# Patient Record
Sex: FEMALE | Race: WHITE | Employment: FULL TIME | URBAN - NONMETROPOLITAN AREA
[De-identification: names, ages, dates, MRNs, and addresses within clinical notes are randomized per-mention and may not be internally consistent; named-entity substitution may affect disease eponyms.]

---
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
## 2023-03-09 ENCOUNTER — HOSPITAL ENCOUNTER (EMERGENCY)
Age: 23
Discharge: HOME | End: 2023-03-09
Payer: COMMERCIAL

## 2023-03-09 VITALS
SYSTOLIC BLOOD PRESSURE: 138 MMHG | OXYGEN SATURATION: 96 % | TEMPERATURE: 98.8 F | WEIGHT: 190 LBS | HEART RATE: 79 BPM | RESPIRATION RATE: 16 BRPM | BODY MASS INDEX: 32.44 KG/M2 | HEIGHT: 64 IN | DIASTOLIC BLOOD PRESSURE: 84 MMHG

## 2023-03-09 DIAGNOSIS — S05.01XA ABRASION OF RIGHT CORNEA, INITIAL ENCOUNTER: Primary | ICD-10-CM

## 2023-03-09 PROCEDURE — 99213 OFFICE O/P EST LOW 20 MIN: CPT

## 2023-03-09 RX ORDER — ERYTHROMYCIN 5 MG/G
OINTMENT OPHTHALMIC EVERY 6 HOURS
Qty: 3.5 G | Refills: 0 | Status: SHIPPED | OUTPATIENT
Start: 2023-03-09 | End: 2023-03-16

## 2023-03-09 RX ORDER — IBUPROFEN 400 MG/1
400 TABLET ORAL EVERY 6 HOURS PRN
Qty: 120 TABLET | Refills: 0 | Status: SHIPPED | OUTPATIENT
Start: 2023-03-09

## 2023-03-09 ASSESSMENT — VISUAL ACUITY
OD: 20/20
OU: 20/20
OS: 20/20

## 2023-03-09 ASSESSMENT — PAIN - FUNCTIONAL ASSESSMENT
PAIN_FUNCTIONAL_ASSESSMENT: 0-10
PAIN_FUNCTIONAL_ASSESSMENT: ACTIVITIES ARE NOT PREVENTED

## 2023-03-09 ASSESSMENT — PAIN SCALES - GENERAL: PAINLEVEL_OUTOF10: 5

## 2023-03-09 ASSESSMENT — PAIN DESCRIPTION - ORIENTATION: ORIENTATION: RIGHT

## 2023-03-09 ASSESSMENT — PAIN DESCRIPTION - LOCATION: LOCATION: EYE

## 2023-03-09 ASSESSMENT — PAIN DESCRIPTION - DESCRIPTORS: DESCRIPTORS: ACHING

## 2023-03-09 NOTE — Clinical Note
Janel London was seen and treated in our emergency department on 3/9/2023. She may return to work on 03/11/2023. If you have any questions or concerns, please don't hesitate to call.       Amelie Mustafa, APRN - CNP

## 2023-03-10 NOTE — ED PROVIDER NOTES
An  Urgent Care Encounter      CHIEF COMPLAINT       Chief Complaint   Patient presents with    Eye Pain       Nurses Notes reviewed and I agree except as noted in the HPI. HISTORY OFPRESENT ILLNESS   Melany Nair is a 23 y.o. The history is provided by the patient. No  was used. REVIEW OF SYSTEMS     Review of Systems    PAST MEDICAL HISTORY   No past medical history on file. SURGICAL HISTORY     Patient  has a past surgical history that includes Tympanostomy tube placement; Tonsillectomy; and Nose surgery. CURRENT MEDICATIONS       Previous Medications    No medications on file       ALLERGIES     Patient is has No Known Allergies. FAMILY HISTORY     Patient's family history is not on file. SOCIAL HISTORY     Patient  reports that she has never smoked. She does not have any smokeless tobacco history on file. She reports that she does not drink alcohol and does not use drugs. PHYSICAL EXAM     ED TRIAGE VITALS   ,  ,  ,  ,    Physical Exam    DIAGNOSTIC RESULTS   Labs:No results found for this visit on 03/09/23. IMAGING:  No orders to display     URGENT CARE COURSE:   There were no vitals filed for this visit. Medications - No data to display  PROCEDURES:  None  FINAL IMPRESSION    No diagnosis found. DISPOSITION/PLAN       PATIENT REFERRED TO:  No follow-up provider specified. DISCHARGE MEDICATIONS:  New Prescriptions    No medications on file     There are no discharge medications for this patient.       Pedro Holland, APRN - CNP

## 2023-03-10 NOTE — ED TRIAGE NOTES
To room with c/o right eye pain. She \"stabbed\" eye with screw  while working at [employer].

## 2023-03-20 ASSESSMENT — ENCOUNTER SYMPTOMS
STRIDOR: 0
VOMITING: 0
BLURRED VISION: 0
COUGH: 0
EYE ITCHING: 0
EYE DISCHARGE: 0
DOUBLE VISION: 0
BLIND SPOTS: 0
EYE REDNESS: 1
PERI-ORBITAL EDEMA: 1
NAUSEA: 0
CRUSTING: 1
APNEA: 0
CHOKING: 0
CHEST TIGHTNESS: 0
WHEEZING: 0
SHORTNESS OF BREATH: 0
EYE INFLAMMATION: 0
EYE PAIN: 1
PHOTOPHOBIA: 1
EYE WATERING: 1

## 2023-03-20 ASSESSMENT — VISUAL ACUITY: OU: 1

## 2024-05-14 ENCOUNTER — HOSPITAL ENCOUNTER (EMERGENCY)
Age: 24
Discharge: HOME | End: 2024-05-14
Payer: COMMERCIAL

## 2024-05-14 VITALS
RESPIRATION RATE: 18 BRPM | WEIGHT: 200 LBS | HEIGHT: 64 IN | TEMPERATURE: 98.6 F | OXYGEN SATURATION: 96 % | BODY MASS INDEX: 34.15 KG/M2 | SYSTOLIC BLOOD PRESSURE: 125 MMHG | HEART RATE: 74 BPM | DIASTOLIC BLOOD PRESSURE: 84 MMHG

## 2024-05-14 DIAGNOSIS — H92.01 OTALGIA OF RIGHT EAR: ICD-10-CM

## 2024-05-14 DIAGNOSIS — R09.A9 FOREIGN BODY SENSATION IN RIGHT EAR CANAL: Primary | ICD-10-CM

## 2024-05-14 PROCEDURE — 99212 OFFICE O/P EST SF 10 MIN: CPT

## 2024-05-14 PROCEDURE — 99213 OFFICE O/P EST LOW 20 MIN: CPT

## 2024-05-14 RX ORDER — MEDROXYPROGESTERONE ACETATE 150 MG/ML
150 INJECTION, SUSPENSION INTRAMUSCULAR
COMMUNITY

## 2024-05-14 ASSESSMENT — PAIN - FUNCTIONAL ASSESSMENT: PAIN_FUNCTIONAL_ASSESSMENT: 0-10

## 2024-05-14 ASSESSMENT — PAIN SCALES - GENERAL: PAINLEVEL_OUTOF10: 3

## 2024-05-14 ASSESSMENT — PAIN DESCRIPTION - LOCATION: LOCATION: EAR

## 2024-05-14 ASSESSMENT — PAIN DESCRIPTION - ORIENTATION: ORIENTATION: RIGHT

## 2024-05-14 NOTE — ED NOTES
Pt with complaints of right ear pain that started 6 days ago. Denies any drainage from ear. States she feels as if something might be in ear so she came in.      Kush Rodriguez LPN  05/14/24 2788

## 2024-05-14 NOTE — ED PROVIDER NOTES
[facility] URGENT CARE  Urgent Care Encounter       CHIEF COMPLAINT       Chief Complaint   Patient presents with    Otalgia     right       Nurses Notes reviewed and I agree except as noted in the HPI.  HISTORY OF PRESENT ILLNESS   Lupe Nick is a 23 y.o. female who presents with complaints of right ear pain that started last Wednesday. Pt reports pain is no longer there but reports she feels like she has an object in her ear. Pt reports \"fluttering\" noise around 20 minutes ago. Patient reports pain 3/10 at this time and discomforting.     The history is provided by the patient.       REVIEW OF SYSTEMS     Review of Systems   HENT:  Positive for ear pain.    All other systems reviewed and are negative.      PAST MEDICAL HISTORY   History reviewed. No pertinent past medical history.    SURGICALHISTORY     Patient  has a past surgical history that includes Tympanostomy tube placement; Tonsillectomy; and Nose surgery.    CURRENT MEDICATIONS       Previous Medications    IBUPROFEN (IBU) 400 MG TABLET    Take 1 tablet by mouth every 6 hours as needed for Pain    MEDROXYPROGESTERONE (DEPO-PROVERA) 150 MG/ML INJECTION    Inject 1 mL into the muscle every 3 months       ALLERGIES     Patient is is allergic to shellfish-derived products.    Patients   There is no immunization history on file for this patient.    FAMILY HISTORY     Patient's family history includes No Known Problems in her father and mother.    SOCIAL HISTORY     Patient  reports that she has never smoked. She does not have any smokeless tobacco history on file. She reports current alcohol use. She reports that she does not use drugs.    PHYSICAL EXAM     ED TRIAGE VITALS  BP: 125/84, Temp: 98.6 °F (37 °C), Pulse: 74, Respirations: 18, SpO2: 96 %,Estimated body mass index is 34.33 kg/m² as calculated from the following:    Height as of this encounter: 1.626 m (5' 4\").    Weight as of this encounter: 90.7 kg (200 lb).,Patient's last

## 2025-06-12 ENCOUNTER — HOSPITAL ENCOUNTER (EMERGENCY)
Age: 25
Discharge: HOME | End: 2025-06-12
Payer: COMMERCIAL

## 2025-06-12 VITALS
OXYGEN SATURATION: 99 % | HEART RATE: 77 BPM | SYSTOLIC BLOOD PRESSURE: 126 MMHG | RESPIRATION RATE: 17 BRPM | TEMPERATURE: 98.2 F | DIASTOLIC BLOOD PRESSURE: 85 MMHG

## 2025-06-12 DIAGNOSIS — J06.9 ACUTE UPPER RESPIRATORY INFECTION: Primary | ICD-10-CM

## 2025-06-12 PROCEDURE — 99213 OFFICE O/P EST LOW 20 MIN: CPT

## 2025-06-12 RX ORDER — CETIRIZINE HYDROCHLORIDE, PSEUDOEPHEDRINE HYDROCHLORIDE 5; 120 MG/1; MG/1
1 TABLET, FILM COATED, EXTENDED RELEASE ORAL 2 TIMES DAILY
Qty: 60 TABLET | Refills: 0 | Status: SHIPPED | OUTPATIENT
Start: 2025-06-12 | End: 2025-07-12

## 2025-06-12 ASSESSMENT — ENCOUNTER SYMPTOMS
GASTROINTESTINAL NEGATIVE: 1
SORE THROAT: 1
EYES NEGATIVE: 1
RESPIRATORY NEGATIVE: 1

## 2025-06-12 NOTE — ED NOTES
Pt to [facility] with c/o sore throat, nasal congestion and cough for 6 days. Pt reports she started out with a fever and now is afebrile. She reports taking ibuprofen for pain. Pt ALLAN.     Cris Taylor RN  06/12/25 2257

## 2025-06-12 NOTE — DISCHARGE INSTRUCTIONS
Rest.  Medications as directed.  Drink plenty of fluids.  Follow-up with primary care provider for any new or worsening symptoms Go to emergency department for any other symptoms or concerns deemed emergent.

## 2025-06-12 NOTE — ED PROVIDER NOTES
[facility] URGENT CARE  UrgentCare Encounter      CHIEFCOMPLAINT       Chief Complaint   Patient presents with    Pharyngitis    Nasal Congestion       Nurses Notes reviewed and I agree except as noted in the HPI.  HISTORY OF PRESENT ILLNESS   Lupe Nick is a 24 y.o. female who presents for sore throat for approximately 6 days.  States she feels that she is having a lot of drainage down the back of her throat.    REVIEW OF SYSTEMS     Review of Systems   Constitutional: Negative.    HENT:  Positive for congestion and sore throat.    Eyes: Negative.    Respiratory: Negative.     Gastrointestinal: Negative.    Genitourinary: Negative.    Musculoskeletal: Negative.    Neurological: Negative.    Psychiatric/Behavioral: Negative.         PAST MEDICAL HISTORY   History reviewed. No pertinent past medical history.    SURGICAL HISTORY     Patient  has a past surgical history that includes Tympanostomy tube placement; Tonsillectomy; and Nose surgery.    CURRENT MEDICATIONS       Discharge Medication List as of 6/12/2025  6:10 PM        CONTINUE these medications which have NOT CHANGED    Details   medroxyPROGESTERone (DEPO-PROVERA) 150 MG/ML injection Inject 1 mL into the muscle every 3 monthsHistorical Med      ibuprofen (IBU) 400 MG tablet Take 1 tablet by mouth every 6 hours as needed for Pain, Disp-120 tablet, R-0Normal             ALLERGIES     Patient is is allergic to shellfish-derived products.    FAMILY HISTORY     Patient'sfamily history includes No Known Problems in her father and mother.    SOCIAL HISTORY     Patient  reports that she has never smoked. She does not have any smokeless tobacco history on file. She reports current alcohol use. She reports that she does not use drugs.    PHYSICAL EXAM     ED TRIAGE VITALS  BP: 126/85, Temp: 98.2 °F (36.8 °C), Pulse: 77, Respirations: 17, SpO2: 99 %  Physical Exam  HENT:      Head: Normocephalic.      Right Ear: Tympanic membrane normal.      Left Ear:  Tympanic membrane normal.      Nose: Congestion present.      Mouth/Throat:      Pharynx: Postnasal drip present. No posterior oropharyngeal erythema.      Tonsils: No tonsillar exudate. 0 on the right. 0 on the left.   Neurological:      Mental Status: She is alert.         DIAGNOSTIC RESULTS   Labs:No results found for this visit on 06/12/25.    IMAGING:  No orders to display     URGENT CARE COURSE:         Medications - No data to display  PROCEDURES:  FINALIMPRESSION      1. Acute upper respiratory infection        DISPOSITION/PLAN   DISPOSITION Decision To Discharge 06/12/2025 06:08:02 PM   DISPOSITION CONDITION Stable     Discussed plan of care with patient.  Patient instructed to Rest.  Medications as directed.  Drink plenty of fluids.  Follow-up with primary care provider for any new or worsening symptoms Go to emergency department for any other symptoms or concerns deemed emergent.    PATIENT REFERRED TO:  Primary care provider    Call   As needed    DISCHARGE MEDICATIONS:  Discharge Medication List as of 6/12/2025  6:10 PM        START taking these medications    Details   cetirizine-psuedoephedrine (ZYRTEC-D) 5-120 MG per extended release tablet Take 1 tablet by mouth 2 times daily, Disp-60 tablet, R-0Normal           Discharge Medication List as of 6/12/2025  6:10 PM          JACOB Brower CNP, Randi, APRN - CNP  06/12/25 1815

## 2025-06-12 NOTE — DISCHARGE INSTR - COC
Continuity of Care Form    Patient Name: Lupe Nick   :  2000  MRN:  [id number]    Admit date:  2025  Discharge date:  ***    Code Status Order: No Order   Advance Directives:     Admitting Physician:  No admitting provider for patient encounter.  PCP: No primary care provider on file.    Discharging Nurse: ***  Discharging Hospital Unit/Room#:   Discharging Unit Phone Number: ***    Emergency Contact:   Extended Emergency Contact Information  Primary Emergency Contact: Sylvia Nick  Address: [address] [place] [id number] [facility]  Home Phone: [phone]  Relation: Parent    Past Surgical History:  Past Surgical History:   Procedure Laterality Date    NOSE SURGERY      TONSILLECTOMY      TYMPANOSTOMY TUBE PLACEMENT         Immunization History:     There is no immunization history on file for this patient.    Active Problems:  There is no problem list on file for this patient.      Isolation/Infection:   Isolation            No Isolation          Patient Infection Status    None to display         Nurse Assessment:  Last Vital Signs: /85   Pulse 77   Temp 98.2 °F (36.8 °C) (Oral)   Resp 17   SpO2 99%     Last documented pain score (0-10 scale):    Last Weight:   Wt Readings from Last 1 Encounters:   24 90.7 kg (200 lb)     Mental Status:  {IP PT MENTAL STATUS:}    IV Access:  { NEREIDA IV ACCESS:710743370}    Nursing Mobility/ADLs:  Walking   {CHP DME ADLs:478413921}  Transfer  {CHP DME ADLs:820615487}  Bathing  {CHP DME ADLs:144597289}  Dressing  {CHP DME ADLs:402543387}  Toileting  {CHP DME ADLs:160537731}  Feeding  {CHP DME ADLs:361386255}  Med Admin  {CHP DME ADLs:076935199}  Med Delivery   { NEREIDA MED Delivery:768259767}    Wound Care Documentation and Therapy:        Elimination:  Continence:   Bowel: {YES / NO:}  Bladder: {YES / NO:}  Urinary Catheter: {Urinary Catheter:852771438}   Colostomy/Ileostomy/Ileal Conduit:  continuing treatment of the diagnosis listed and that she requires {Admit to Appropriate Level of Care:54912} for {GREATER/LESS:723987043} 30 days.     Update Admission H&P: {CHP DME Changes in HandP:198902605}    PHYSICIAN SIGNATURE:  {Esignature:295881058}